# Patient Record
Sex: FEMALE | Race: WHITE | HISPANIC OR LATINO | Employment: FULL TIME | URBAN - METROPOLITAN AREA
[De-identification: names, ages, dates, MRNs, and addresses within clinical notes are randomized per-mention and may not be internally consistent; named-entity substitution may affect disease eponyms.]

---

## 2023-11-24 ENCOUNTER — OFFICE VISIT (OUTPATIENT)
Dept: OBSTETRICS AND GYNECOLOGY | Facility: CLINIC | Age: 38
End: 2023-11-24
Payer: MEDICAID

## 2023-11-24 VITALS
DIASTOLIC BLOOD PRESSURE: 61 MMHG | HEIGHT: 63 IN | BODY MASS INDEX: 29.64 KG/M2 | WEIGHT: 167.31 LBS | SYSTOLIC BLOOD PRESSURE: 116 MMHG

## 2023-11-24 DIAGNOSIS — Z12.4 PAP SMEAR FOR CERVICAL CANCER SCREENING: ICD-10-CM

## 2023-11-24 DIAGNOSIS — Z00.00 ANNUAL PHYSICAL EXAM: Primary | ICD-10-CM

## 2023-11-24 DIAGNOSIS — Z30.8 ENCOUNTER FOR TUBAL LIGATION COUNSELING: ICD-10-CM

## 2023-11-24 PROCEDURE — 1159F MED LIST DOCD IN RCRD: CPT | Mod: CPTII,,, | Performed by: OBSTETRICS & GYNECOLOGY

## 2023-11-24 PROCEDURE — 3078F DIAST BP <80 MM HG: CPT | Mod: CPTII,,, | Performed by: OBSTETRICS & GYNECOLOGY

## 2023-11-24 PROCEDURE — 3074F PR MOST RECENT SYSTOLIC BLOOD PRESSURE < 130 MM HG: ICD-10-PCS | Mod: CPTII,,, | Performed by: OBSTETRICS & GYNECOLOGY

## 2023-11-24 PROCEDURE — 3008F BODY MASS INDEX DOCD: CPT | Mod: CPTII,,, | Performed by: OBSTETRICS & GYNECOLOGY

## 2023-11-24 PROCEDURE — 99999 PR PBB SHADOW E&M-EST. PATIENT-LVL IV: CPT | Mod: PBBFAC,,, | Performed by: OBSTETRICS & GYNECOLOGY

## 2023-11-24 PROCEDURE — 3078F PR MOST RECENT DIASTOLIC BLOOD PRESSURE < 80 MM HG: ICD-10-PCS | Mod: CPTII,,, | Performed by: OBSTETRICS & GYNECOLOGY

## 2023-11-24 PROCEDURE — 1160F RVW MEDS BY RX/DR IN RCRD: CPT | Mod: CPTII,,, | Performed by: OBSTETRICS & GYNECOLOGY

## 2023-11-24 PROCEDURE — 3008F PR BODY MASS INDEX (BMI) DOCUMENTED: ICD-10-PCS | Mod: CPTII,,, | Performed by: OBSTETRICS & GYNECOLOGY

## 2023-11-24 PROCEDURE — 99385 PR PREVENTIVE VISIT,NEW,18-39: ICD-10-PCS | Mod: S$PBB,,, | Performed by: OBSTETRICS & GYNECOLOGY

## 2023-11-24 PROCEDURE — 99214 OFFICE O/P EST MOD 30 MIN: CPT | Mod: PBBFAC,PN | Performed by: OBSTETRICS & GYNECOLOGY

## 2023-11-24 PROCEDURE — 88175 CYTOPATH C/V AUTO FLUID REDO: CPT | Performed by: OBSTETRICS & GYNECOLOGY

## 2023-11-24 PROCEDURE — 1160F PR REVIEW ALL MEDS BY PRESCRIBER/CLIN PHARMACIST DOCUMENTED: ICD-10-PCS | Mod: CPTII,,, | Performed by: OBSTETRICS & GYNECOLOGY

## 2023-11-24 PROCEDURE — 99385 PREV VISIT NEW AGE 18-39: CPT | Mod: S$PBB,,, | Performed by: OBSTETRICS & GYNECOLOGY

## 2023-11-24 PROCEDURE — 1159F PR MEDICATION LIST DOCUMENTED IN MEDICAL RECORD: ICD-10-PCS | Mod: CPTII,,, | Performed by: OBSTETRICS & GYNECOLOGY

## 2023-11-24 PROCEDURE — 87624 HPV HI-RISK TYP POOLED RSLT: CPT | Performed by: OBSTETRICS & GYNECOLOGY

## 2023-11-24 PROCEDURE — 3074F SYST BP LT 130 MM HG: CPT | Mod: CPTII,,, | Performed by: OBSTETRICS & GYNECOLOGY

## 2023-11-24 PROCEDURE — 99999 PR PBB SHADOW E&M-EST. PATIENT-LVL IV: ICD-10-PCS | Mod: PBBFAC,,, | Performed by: OBSTETRICS & GYNECOLOGY

## 2023-11-24 RX ORDER — LIDOCAINE 50 MG/G
1 PATCH TOPICAL
COMMUNITY
Start: 2023-10-24 | End: 2024-04-02

## 2023-11-24 RX ORDER — GABAPENTIN 300 MG/1
300 CAPSULE ORAL 3 TIMES DAILY
COMMUNITY
Start: 2023-10-24

## 2023-11-24 RX ORDER — MEDROXYPROGESTERONE ACETATE 150 MG/ML
INJECTION, SUSPENSION INTRAMUSCULAR
Status: CANCELLED | OUTPATIENT
Start: 2023-11-24

## 2023-11-24 RX ORDER — FLUTICASONE PROPIONATE AND SALMETEROL 50; 250 UG/1; UG/1
1 POWDER RESPIRATORY (INHALATION) 2 TIMES DAILY
COMMUNITY

## 2023-11-24 RX ORDER — DICLOFENAC SODIUM 10 MG/G
2 GEL TOPICAL 4 TIMES DAILY
COMMUNITY
Start: 2023-10-24

## 2023-11-24 RX ORDER — SODIUM CHLORIDE 9 MG/ML
INJECTION, SOLUTION INTRAVENOUS CONTINUOUS
Status: CANCELLED | OUTPATIENT
Start: 2023-11-24

## 2023-11-24 RX ORDER — MUPIROCIN 20 MG/G
OINTMENT TOPICAL
Status: CANCELLED | OUTPATIENT
Start: 2023-11-24

## 2023-11-24 RX ORDER — FAMOTIDINE 20 MG/1
20 TABLET, FILM COATED ORAL
Status: CANCELLED | OUTPATIENT
Start: 2023-11-24

## 2023-11-24 RX ORDER — MEDROXYPROGESTERONE ACETATE 150 MG/ML
INJECTION, SUSPENSION INTRAMUSCULAR
COMMUNITY
Start: 2023-06-08

## 2023-11-25 NOTE — PROGRESS NOTES
GYN Annual exam  2023    Chief Complaint   Patient presents with    Well Woman   Desires tubal ligation      HISTORY OF PRESENT ILLNESS:  Pt is a  37 y.o.  alert female who presents today for GYN annual exam.  She  her  2 months ago.  She is not currently sexually active.  She is currently using Depo for contraception for the past 1 year.  She previously had an IUD in the past for about 10 years.  She strongly desires permanent sterilization desire any future children.  She denies pelvic pain, vaginal discharge, breast concerns.    Patient's last menstrual period was 11/15/2023 (approximate).    Review of patient's allergies indicates:  No Known Allergies    Current Outpatient Medications on File Prior to Visit   Medication Sig Dispense Refill    albuterol (PROVENTIL/VENTOLIN HFA) 90 mcg/actuation inhaler Inhale 1-2 puffs into the lungs every 6 (six) hours as needed for Wheezing or Shortness of Breath. Rescue 8 g 0    albuterol-ipratropium (DUO-NEB) 2.5 mg-0.5 mg/3 mL nebulizer solution Take 3 mLs by nebulization every 6 (six) hours as needed for Wheezing. Rescue 75 mL 0    diclofenac sodium (VOLTAREN) 1 % Gel Apply 2 g topically 4 (four) times daily.      gabapentin (NEURONTIN) 300 MG capsule Take 300 mg by mouth 3 (three) times daily.      LIDOcaine (LIDODERM) 5 % 1 patch.      medroxyPROGESTERone (DEPO-PROVERA) 150 mg/mL Syrg Inject 1 mL every 3 months by intramuscular route.      ADVAIR DISKUS 250-50 mcg/dose diskus inhaler Inhale 1 puff into the lungs 2 (two) times daily.       No current facility-administered medications on file prior to visit.       Past Medical History:   Diagnosis Date    Asthma             Past Surgical History:   Procedure Laterality Date     SECTION         Social History     Socioeconomic History    Marital status: Single   Tobacco Use    Smoking status: Never    Smokeless tobacco: Never   Substance and Sexual Activity    Alcohol use: Not Currently     "Drug use: Never                     History reviewed. No pertinent family history.    OB History    Para Term  AB Living   1 1 1         SAB IAB Ectopic Multiple Live Births                  # Outcome Date GA Lbr Bakari/2nd Weight Sex Delivery Anes PTL Lv   1 Term              Gynecological History:     Denies history of abnormal Pap smears or sexually transmitted diseases.    REVIEW OF SYSTEMS:  Negative except as above.    PHYSICAL EXAM  /61   Ht 5' 3" (1.6 m)   Wt 75.9 kg (167 lb 5.3 oz)   LMP 11/15/2023 (Approximate)   BMI 29.64 kg/m²   GENERAL APPEARANCE:  The patient is a pleasant, normal appearing female with normal affect and in no distress.  LUNGS: Clear bilaterally with normal respiratory effort  HEART:   Regular rate and rhythm.  No murmurs noted.    BREASTS:  Patient declined  ABDOMEN: Soft, non-tender, non-distended.  No hepatosplenomegaly.  No umbilical or inguinal hernias.  Well-healed Pfannenstiel skin incision  SKIN:  Warm and dry to touch.  No lesions or rashes noted.    PSYCHIATRIC/NEUROLOGIC:  Appropriate mood and affect, normal recall, alert and oriented x 3  EXTREMITIES:  Warm and well perfused.  No edema noted.    :  Vulva: Inspection of her external genitalia reveals normal mons pubis, labia minora and labia majora.  Normal appearing clitoris, urethral meatus and [place]'s glands.    Bladder:  No evidence of urethral or bladder tenderness.    Vagina:  Speculum exam reveals pink and moist vaginal mucosa.  Bartholin gland is normal to palpation.  Cervix:  Cervix is normal in appearance with no lesions.  There is no cervical motion tenderness.  Uterus:  Uterus is normal size, mobile and non-tender.  No adnexal masses are palpated.  Adnexa are non-tender to palpation  Perineum:  Perineum appears normal.  Anus:  Normal with no apparent lesions.       ASSESSMENT/PLAN:  Pt is a  37 y.o.  alert female who presents today for GYN annual exam.  - Pap with HPV done and " pending  - GCCT and Trich testing declined  - Serum STD testing declined  - Clinical breast examination declined by patient  - Gardasil 9 vaccination series declined  - Mammogram: Discussions regarding screening mammography to start at age 40 per the most current ACOG guidelines  - We discussed consistent aerobic exercise, consistent seatbelt use, and consistent women's multivitamin use with both vitamin D and calcium supplementation; self-breast awareness was discussed and taught; the new Pap smear guidelines were reviewed     Consult for female sterilization  Contraceptive counseling: We reviewed her contraceptive options including progestin only OCP's (oral contraceptives), Depo, IUD's (intrauterine device) including sklya, Mirena, Paragard, Nexplanon, vasectomy, and condoms as well as permanent sterilization. Risks and benefits of each were reviewed. The patient noted that she is 100% certain she does not desire future fertility and mentions the information as noted above regarding never wanting to have children. [place] medicaid consent form signed on 11/24/23. We discussed today the nonreversible nature of sterilization and the failure rate of 5-50/1000.  Essure is no longer available. We discussed risks and benefits of laparoscopic tubal ligation with  bilateral salpingectomy, including infection, bleeding and need for transfusion, injury to internal organs and need for surgical repair, injury to blood vessels or nerves and risk of general anesthesia, as well as the risk of laparotomy in the case of complications, small risk of failure with increased risk of ectopic pregnancy if fails and the risk of regret.  There is also a reduction in the future risk of ovarian cancer. If she did desire pregnancy after this it would require IVF which can cost tens of thousands of dollars. Pt wishes to proceed with laparoscopic bilateral salpingectomy.  I will work on arranging this surgery for January of 2024.  Patient  will continue to use Depo conception in the interim.  Her last Depo shot was November 16th.    Follow up in 4-6 weeks for pre op visit.     Praveen Valenzuela  11/24/2023

## 2023-11-28 ENCOUNTER — TELEPHONE (OUTPATIENT)
Dept: OBSTETRICS AND GYNECOLOGY | Facility: CLINIC | Age: 38
End: 2023-11-28
Payer: MEDICAID

## 2023-11-28 NOTE — TELEPHONE ENCOUNTER
Called pt in regards to surgery date and time and pre-op appt. Appt made and pt vu.  ----- Message from Praveen Valenzuela MD sent at 11/28/2023  9:16 AM CST -----  Please call her and let her know her surgery for tubal ligation is scheduled on 1/29/24 at [facility] at 8 AM. I need to see her for a pre op on 1/23/24 to sign consent forms and review surgical pre op instructions.     Praveen Valenzuela    ----- Message -----  From: Praveen Valenzuela MD  Sent: 11/28/2023  12:00 AM CST  To: Praveen Valenzuela MD    See if tubal ligation scheduled.  Set up pre op visit  Call patient with surgery details.

## 2023-11-29 LAB
HPV HR 12 DNA SPEC QL NAA+PROBE: NEGATIVE
HPV16 AG SPEC QL: NEGATIVE
HPV18 DNA SPEC QL NAA+PROBE: NEGATIVE

## 2023-12-03 LAB
FINAL PATHOLOGIC DIAGNOSIS: NORMAL
Lab: NORMAL

## 2023-12-04 DIAGNOSIS — Z11.3 ENCOUNTER FOR SCREENING FOR INFECTIONS WITH PREDOMINANTLY SEXUAL MODE OF TRANSMISSION: ICD-10-CM

## 2023-12-04 DIAGNOSIS — Z72.51 HIGH RISK HETEROSEXUAL BEHAVIOR: Primary | ICD-10-CM

## 2023-12-04 RX ORDER — METRONIDAZOLE 500 MG/1
500 TABLET ORAL 2 TIMES DAILY
Qty: 14 TABLET | Refills: 0 | Status: SHIPPED | OUTPATIENT
Start: 2023-12-04 | End: 2023-12-11

## 2024-01-10 ENCOUNTER — TELEPHONE (OUTPATIENT)
Dept: OBSTETRICS AND GYNECOLOGY | Facility: CLINIC | Age: 39
End: 2024-01-10

## 2024-01-10 NOTE — TELEPHONE ENCOUNTER
----- Message from Praveen Valenzuela MD sent at 1/9/2024  8:20 PM CST -----  Hello-    This patient is scheduled to have surgery with me at [facility] on 1/29/24. It looks like her pre op visit got cancelled because I'm on the labor floor on 1/23/24. Please see if she can come to [facility] to my clinic on 1/16/24 or 1/18/24 for a pre op visit to sign surgical consents and review surgical plans.      Praveen Valenzuela

## 2024-01-10 NOTE — TELEPHONE ENCOUNTER
Called pt to schedule pre-op visit, no answer, lvm asking pt to call office along with available dates.

## 2024-01-11 ENCOUNTER — PATIENT MESSAGE (OUTPATIENT)
Dept: OBSTETRICS AND GYNECOLOGY | Facility: CLINIC | Age: 39
End: 2024-01-11

## 2024-01-17 ENCOUNTER — TELEPHONE (OUTPATIENT)
Dept: OBSTETRICS AND GYNECOLOGY | Facility: CLINIC | Age: 39
End: 2024-01-17

## 2024-01-17 NOTE — TELEPHONE ENCOUNTER
Attempted to call patient to reschedule pre op visit for upcoming surgery on 1/29/24. Asked her to contact my office to let me know when a good time to reach her would be.    Praveen Valenzuela

## 2024-01-22 ENCOUNTER — TELEPHONE (OUTPATIENT)
Dept: OBSTETRICS AND GYNECOLOGY | Facility: CLINIC | Age: 39
End: 2024-01-22

## 2024-01-22 DIAGNOSIS — Z30.2 ENCOUNTER FOR STERILIZATION: ICD-10-CM

## 2024-01-22 DIAGNOSIS — Z30.2 ADMISSION FOR TUBAL LIGATION: Primary | ICD-10-CM

## 2024-01-22 RX ORDER — FAMOTIDINE 20 MG/1
20 TABLET, FILM COATED ORAL
Status: CANCELLED | OUTPATIENT
Start: 2024-01-22

## 2024-01-22 RX ORDER — MUPIROCIN 20 MG/G
OINTMENT TOPICAL
Status: CANCELLED | OUTPATIENT
Start: 2024-01-22

## 2024-01-22 RX ORDER — SODIUM CHLORIDE 9 MG/ML
INJECTION, SOLUTION INTRAVENOUS CONTINUOUS
Status: CANCELLED | OUTPATIENT
Start: 2024-01-22

## 2024-01-22 NOTE — TELEPHONE ENCOUNTER
Attempted to contact pt regarding her upcoming surgery. LVM    ----- Message from Praveen Valenzuela MD sent at 1/22/2024  1:04 PM CST -----  Please call her and let her know her pre op visit will be on 1/30/24 at [facility] at 3:45 pm.    Her surgery will be at [facility] on 2/27/24 at 7:30 AM.    Praveen Valenzuela

## 2024-01-30 ENCOUNTER — PATIENT MESSAGE (OUTPATIENT)
Dept: OBSTETRICS AND GYNECOLOGY | Facility: CLINIC | Age: 39
End: 2024-01-30